# Patient Record
(demographics unavailable — no encounter records)

---

## 2024-10-15 NOTE — ASSESSMENT
[Educated Patient & Family about Diagnosis] : educated the patient and family about the diagnosis [Discussed with Family to Call in ____ week(s) for Test Results] : discussed with family to call in [unfilled] week(s) to obtain test results and with update on child's condition.  Family should call sooner if clinically indicated. [FreeTextEntry1] : 18 year old patient with elevated liver enzymes that has had evaluation for underlying liver disease with findings so far consistent with MASLD. CAP score has elevated from last visit, likely a result to his eating habits during the summertime; but elastography remains normal. He has already taken steps to go back to healthy lifestyle including eating healthier options, portion control and is more active overall. I encourage Juno to continue these changes. Reassuring no symptoms or signs of advance liver disease.  We will continue monitoring of his condition every 6 months and will trend fibroscan overtime. Monitoring labs ordered today. Ultrasound to follow gallbladder polyp.   Juno gave permission to contact mother to disclose results of today's visit.    I spent 45 minutes reviewing the patient's diagnostic tests, examining the patient, discussing the next steps for treatment plan, adding additional orders for labs and documenting in the patient's medical record

## 2024-10-15 NOTE — CONSULT LETTER
[Dear  ___] : Dear  [unfilled], [Consult Letter:] : I had the pleasure of evaluating your patient, [unfilled]. [Please see my note below.] : Please see my note below. [Consult Closing:] : Thank you very much for allowing me to participate in the care of this patient.  If you have any questions, please do not hesitate to contact me. [Sincerely,] : Sincerely, [FreeTextEntry3] : Talia Ibrahim MD Division of Pediatric Gastroenterology and Nutrition [facility] [address], [address] [place] [address] Tel: [phone] Fax: [phone]

## 2024-10-15 NOTE — HISTORY OF PRESENT ILLNESS
[FreeTextEntry1] : 18 year old previously healthy here for follow up of MASLD  Patient was found to have elevated liver enzymes and hepatic steatosis on ultrasound for the first time on 05/08/23 as part of routine evaluation done by PMD. AST 35 ALT 67 Triglycerides: 237  On 06/12/23 evaluation for underlying liver disease was ordered, only obtained until 10/2/23 with negative/normal results (see lab section) for ceruloplasmin, CPK, celiac panel, AIH titers, MENDEZ-D, [id number], Hep A,B,C negative. Hepatitis A and B immune.  Thyroid testing: high TSH: 6 FreeT4: normal.  Lipid panel: high TG and low HDL Ultrasound 06/2023: hepatic steatosis, gallbladder polyp of 0.4 cm  fibrsocan  06/12/23: probe: M CAP: 290 E: 5 10/2023: probe XL  TE: 4.1 04/09/24: probe XL  TE: 5 10/15/24: probe XL  TE: 4.8  Last set of liver labs 04/24: AST: 47   ALT: 93  Bili: 1.4  GGT: 16  Alkaline phosphatase: 133 INR:1   Platelets 244  He went to [facility] for a month in the summertime and he overate during the trip and also as he started college he has had more stress overall as a result he gained about 13 lbs since last visit. He has changed his nutrition about a month ago, to eat again healthy, less processed foods, and is trying to walk a lot during the day.  He has already lost about 9 lbs    No recent infections or antibiotics needed.   He is asymptomatic. He denied symptoms of chronic liver disease, including jaundice, hematemesis, blood in the stools, fatigue or anorexia and acholic stools.   Saw endocrinology and cleared him from abnormal thyroid testing.   Medications: Denied any dietary supplements, herbal medicines or any other drugs.   Pertinent family history: mother with fatty liver disease, father with stroke, grandmother had cholestasis of pregnancy. No diabetes in the family.

## 2024-10-15 NOTE — PHYSICAL EXAM
[Well Developed] : well developed [Well Nourished] : well nourished [Alert and Active] : alert and active [Adipose Appearing] : adipose appearing [Regular Rate and Rhythm] : regular rate and rhythm [Soft] : soft [No HSM] : no hepatosplenomegaly appreciated [Normal Tone] : normal tone [Acanthosis Nigricans] : acanthosis nigricans [icteric] : anicteric [Oral Ulcers] : no oral ulcers [Respiratory Distress] : no respiratory distress  [Distended] : non distended [Tender] : non tender [Lymphadenopathy] : no lymphadenopathy  [Edema] : no edema [Jaundice] : no jaundice

## 2024-10-15 NOTE — REVIEW OF SYSTEMS
[Fever] : no fever [Pallor] : ~T no ~M pallor [Icteric Sclera] : no icteric sclera [Oral Ulcer] : no oral ulcer [Shortness Of Breath] : no shortness of breath [Murmur] : no murmur [Joint Swelling] : no joint swelling [Seizure] : no seizures [Bruising] : no bruising [Immune Deficiencies] : no immune deficiencies [Jaundice] : no jaundice [Pruritus] : no pruritus

## 2025-04-15 NOTE — ASSESSMENT
[Educated Patient & Family about Diagnosis] : educated the patient and family about the diagnosis [Discussed with Family to Call in ____ week(s) for Test Results] : discussed with family to call in [unfilled] week(s) to obtain test results and with update on child's condition.  Family should call sooner if clinically indicated. [FreeTextEntry1] : 18 year old patient with elevated liver enzymes that has had evaluation for underlying liver disease with findings so far consistent with MASLD and gallbladder polyp.  Since last visit Juno has back again started to eat healthy, decreasing sweetened beverages and exercising more. Today's fibroscan showed improvement of CAP score and elastography remains normal. I encourage Juno to stay consistent with lifestyle changes.  We will continue monitoring of his condition every 6 months and will trend fibroscan overtime. Monitoring labs ordered today. Ultrasound to follow gallbladder polyp showed no growth or changes in polyp; will continue to monitor with yearly ultrasounds.   Juno gave permission to contact mother to disclose results of today's visit.    I spent 45 minutes reviewing the patient's diagnostic tests, examining the patient, discussing the next steps for treatment plan, adding additional orders for labs and documenting in the patient's medical record

## 2025-04-15 NOTE — HISTORY OF PRESENT ILLNESS
[FreeTextEntry1] : 18 year old previously healthy here for follow up of MASLD and gallbladder polyp.   Patient was found to have elevated liver enzymes and hepatic steatosis on ultrasound for the first time on 05/08/23 as part of routine evaluation done by PMD. AST 35 ALT 67 Triglycerides: 237  On 06/12/23 evaluation for underlying liver disease was ordered, only obtained until 10/2/23 with negative/normal results (see lab section) for ceruloplasmin, CPK, celiac panel, AIH titers, MENDEZ-D, O1UHwneyevsym, Hep A,B,C negative. Hepatitis A and B immune.  Thyroid testing: high TSH: 6 FreeT4: normal.  Lipid panel: high TG and low HDL Ultrasound 06/2023: hepatic steatosis, gallbladder polyp of 0.4 cm Ultrasound 12/2024: Liver: Enlarged measuring 18.2 cm. There is diffusely increased hepatic echogenicity compatible steatosis. There fatty sparing adjacent to the gallbladder fossa. Bile ducts: Normal caliber. Common bile duct measures 4 mm. Gallbladder: There is a 0.4 cm echogenic focus adjacent to the gallbladder wall which may represent a small polyp. No cholelithiasis. No gallbladder wall thickening or pericholecystic fluid. IMPRESSION: 1. Hepatomegaly and hepatic steatosis 2. A 0.4 cm gallbladder polyp  fibrsocan  06/12/23: probe: M CAP: 290 E: 5 10/2023: probe XL  TE: 4.1 04/09/24: probe XL  TE: 5 10/15/24: probe XL  TE: 4.8 4/15/25: probe XL    TE: 5.9  Last set of liver labs 10/24: AST: 55 (47)  ALT: 134 (93)  Bili: 0.7(1.4)  GGT: 21  Alkaline phosphatase: 100 Platelets 263  From last visit when he had regained weight, he has now lost 7 lbs. BMI: 88% He has started going to the gym again. He is doing weight lifting at the gym and playing volleyball.   No recent infections or antibiotics needed.   He is asymptomatic. He denied symptoms of chronic liver disease, including jaundice, hematemesis, blood in the stools, fatigue or anorexia and acholic stools.   Medications: Denied any dietary supplements, herbal medicines or any other drugs.   Pertinent family history: mother with fatty liver disease, father with stroke, grandmother had cholestasis of pregnancy. No diabetes in the family.

## 2025-04-16 NOTE — HISTORY OF PRESENT ILLNESS
[FreeTextEntry1] : Nutritionist intake: Juno is an 18 year old previously healthy male who sees Dr. Ibrahim for MASLD and gallbladder polyp. He was originally seen by nutrition in 2023 and now referred back to nutrition to review low simple sugar diet that has help him in the past as his liver enzymes slightly up from prior labs, which went along with increase of CAP score on last fibroscan.  Weight: 84.6kg, prior weight 10/15/4: 87.9kg. He has lost 3.3 kg in 6 months. Weight/age decreased from 92nd to 88th %tile and BMI from 97th to 95th %tile  Diet history: Juno believes he lost weight because he is eating a little better. Admits skipping meals has helped him lose weight (purposely skipped meals to lose weight). B: skips L: "whatever is at home" or will eat at college dining bee (rice, bread,chicken) or skips after school: cereal, cup of noodles, or leftovers D: [facility] [place] cooks most nights: chicken, beef, steak, rice, lettuce, salad (only vegetables he will eat) snacks: been eating less, prefers chips, salty stuff drinks lots of water, no soda, occasionally diet soda will get fast food with friends or girlfriend  Physical activity: volleyball class, gym (lifts weight)

## 2025-04-16 NOTE — HISTORY OF PRESENT ILLNESS
[FreeTextEntry1] : Nutritionist intake: Juno is an 18 year old previously healthy male who sees Dr. Ibrahim for MASLD and gallbladder polyp. He was originally seen by nutrition in 2023 and now referred back to nutrition to review low simple sugar diet that has help him in the past as his liver enzymes slightly up from prior labs, which went along with increase of CAP score on last fibroscan.  Weight: 84.6kg, prior weight 10/15/4: 87.9kg. He has lost 3.3 kg in 6 months. Weight/age decreased from 92nd to 88th %tile and BMI from 97th to 95th %tile  Diet history: Juno believes he lost weight because he is eating a little better. Admits skipping meals has helped him lose weight (purposely skipped meals to lose weight). B: skips L: "whatever is at home" or will eat at college dining bee (rice, bread,chicken) or skips after school: cereal, cup of noodles, or leftovers D: [facility] [place] cooks most nights: chicken, beef, steak, rice, lettuce, salad (only vegetables he will eat) snacks: been eating less, prefers chips, salty stuff drinks lots of water, no soda, occasionally diet soda will get fast food with friends or girlfriend  Physical activity: volleyball class, gym (lifts weight)